# Patient Record
Sex: FEMALE | Race: WHITE | NOT HISPANIC OR LATINO | URBAN - METROPOLITAN AREA
[De-identification: names, ages, dates, MRNs, and addresses within clinical notes are randomized per-mention and may not be internally consistent; named-entity substitution may affect disease eponyms.]

---

## 2024-04-12 ENCOUNTER — TELEPHONE (OUTPATIENT)
Dept: DERMATOLOGY | Facility: CLINIC | Age: 49
End: 2024-04-12
Payer: COMMERCIAL

## 2024-04-12 NOTE — TELEPHONE ENCOUNTER
Pt called and left message stating she would like contacted for an appointment with Dr. Yfn Elliott for a lesion on her face.     Can someone please reach out to patient to schedule for appointment for lesion check?    [phone]    Thank you!    Darío Sousa LPN

## 2024-04-16 ENCOUNTER — OFFICE VISIT (OUTPATIENT)
Dept: DERMATOLOGY | Facility: CLINIC | Age: 49
End: 2024-04-16
Payer: COMMERCIAL

## 2024-04-16 DIAGNOSIS — L81.4 LENTIGO: ICD-10-CM

## 2024-04-16 DIAGNOSIS — L57.0 ACTINIC KERATOSIS: Primary | ICD-10-CM

## 2024-04-16 PROCEDURE — 99213 OFFICE O/P EST LOW 20 MIN: CPT | Performed by: DERMATOLOGY

## 2024-04-16 PROCEDURE — 17000 DESTRUCT PREMALG LESION: CPT | Performed by: DERMATOLOGY

## 2024-04-16 RX ORDER — ALBUTEROL SULFATE 90 UG/1
2 AEROSOL, METERED RESPIRATORY (INHALATION) EVERY 4 HOURS PRN
COMMUNITY
Start: 2023-05-31

## 2024-04-16 RX ORDER — CETIRIZINE HYDROCHLORIDE 10 MG/1
10 TABLET ORAL 2 TIMES DAILY
COMMUNITY
Start: 2022-09-21

## 2024-04-16 RX ORDER — BISMUTH SUBSALICYLATE 262 MG
1 TABLET,CHEWABLE ORAL DAILY
COMMUNITY

## 2024-04-16 RX ORDER — HYDROCHLOROTHIAZIDE 25 MG/1
25 TABLET ORAL DAILY
COMMUNITY

## 2024-04-16 NOTE — PROGRESS NOTES
Ronn Gan is a 49 y.o. female who presents for the following: Suspicious Skin Lesion (Right cheek- x 4 weeks. Pt reports family history of melanoma. ).     Review of Systems:  No other skin or systemic complaints other than what is documented elsewhere in the note.    The following portions of the chart were reviewed this encounter and updated as appropriate:   Allergies  Meds  Problems  Med Hx  Surg Hx  Fam Hx         Skin Cancer History  No skin cancer on file.      Specialty Problems    None       Objective   Well appearing patient in no apparent distress; mood and affect are within normal limits.    A focused skin examination was performed. All findings within normal limits unless otherwise noted below.    Assessment/Plan   1. Actinic keratosis  Right Buccal Cheek  Erythematous scaly macule(s)    -Discussed nature of diagnosis and treatment options.   -Patient wishes to proceed with Cryotherapy today  -Possible side effects of liquid nitrogen treatment reviewed including formation of blisters, crusting, tenderness, scar, and discoloration which may be permanent.  -Patient advised to return the office for re-evaluation if the treated lesion(s) do not resolve within 4-6 weeks. Patient verbalizes understanding.    Destr of lesion - Right Buccal Cheek  Complexity: simple    Destruction method: cryotherapy    Informed consent: discussed and consent obtained    Lesion destroyed using liquid nitrogen: Yes    Outcome: patient tolerated procedure well with no complications    Post-procedure details: wound care instructions given      2. [place]  Head - Anterior (Face)  Tan macules    -Benign appearing on exam  -Reassurance, recommend observation        Follow up in 1 year for FSE if patient is willing; patient declines FSE today  Discussed if there are any changes or development of concerning symptoms (lesion/skin condition is changing, bleeding, enlarging, or worsening) the patient is to contact  my office. The patient verbalizes understanding.    Georgina Elliott MD  4/16/2024